# Patient Record
Sex: MALE | NOT HISPANIC OR LATINO | URBAN - METROPOLITAN AREA
[De-identification: names, ages, dates, MRNs, and addresses within clinical notes are randomized per-mention and may not be internally consistent; named-entity substitution may affect disease eponyms.]

---

## 2017-05-18 NOTE — PATIENT DISCUSSION
Cataract(s) are not yet visually significant to the patient. Recommend monitoring, and patient agrees with this plan.

## 2017-05-18 NOTE — PATIENT DISCUSSION
Despite some risk factors, the patient does not demonstrate definitive evidence of glaucoma at this time.

## 2017-05-18 NOTE — PATIENT DISCUSSION
Discussed condition and exacerbating conditions/situations (e.g., dry/arid environments, overhead fans, air conditioners, side effect of medications).

## 2019-07-22 NOTE — PATIENT DISCUSSION
Contact Lens trial with NORMA/STEVIE, RTC to assess adaption. Pt was previous CL wearer. I&R reviewed, solution dispensed.

## 2019-08-16 NOTE — PATIENT DISCUSSION
Cataract surgery has been performed in the first eye and activities of daily living are still impaired. The patient would like to proceed with cataract surgery in the second eye as scheduled. The patient elects CV , goal of -2.00.

## 2019-08-23 NOTE — PATIENT DISCUSSION
JOSE - positive, JOSE pattern - nucleolar; refer for Additional workup by Rheumatologist. Testing for Home O2    O2 Sats on RA at rest = 95%    O2 sats on RA with exercise  = 85%    O2 sats on 2L O2 with exercise = 94%

## 2019-10-24 NOTE — PROCEDURE NOTE: SURGICAL
Prior to commencing surgery, Dr. Cris Estrada confirmed patient identification, surgical procedure, site and side. Following topical proparacaine anesthesia, the patient was positioned at the YAG laser, a contact lens was coupled to the cornea of the right eye with methylcellulose and an axial posterior capsulotomy was performed without complication using 3.2 Mj x 26. Attention was turned to the left eye and a contact lens was coupled to the cornea of the left eye with methylcellulose and an axial posterior capsulotomy was performed without complication using 3.2 Mj x 25. Excess methylcellulose was washed from both eyes, one drop of Alphagan was instilled in each eye and the patient returned to the holding area having tolerated the procedure well and without complication. <br />[id number]<TG /><br />

## 2019-11-12 NOTE — PATIENT DISCUSSION
advised patient she may not be adapting to MonoVision , patient should trial a CL in OS to treat astig. , if patient still has glare rec CL trial for ELMER SAHA.

## 2019-11-12 NOTE — PATIENT DISCUSSION
w/ Epiphora , if patient continues to be bothered may recommend Conj Plasty . rec  to monitor at this time.

## 2019-11-13 NOTE — PATIENT DISCUSSION
CL trial with MV astigmatism correction dispensd, RTC later today and reassess. Continue trial order 2 other MV options and reassess prior to finalizing plan.

## 2019-12-04 NOTE — PATIENT DISCUSSION
advised patient she can wear glasses that can be made for driving in order to balance distance vision while driving,. discussed with patient that we are striving for the most independence from glasses , she may want glasses for some things.

## 2019-12-04 NOTE — PATIENT DISCUSSION
patient is more happy with the astigmatism correction in CL so  REC EPI-LASEK w/ MMC  goal -1.50 to -1.75.

## 2020-01-13 NOTE — PROCEDURE NOTE: CLINICAL
PROCEDURE NOTE: Probing of Lacrimal Canaliculi, With or Without Irrigation OS. Diagnosis: Epiphora Due to Insufficient Drainage. Risks, benefits and alternatives discussed. The patient desires to proceed with probe and irrigation of the involved puncta today and the puncta/lacrimal system was found to be patent. See chart plan notes for further discussion. Patient tolerated the procedure well and left in good condition. Bria Sultana

## 2020-01-13 NOTE — PATIENT DISCUSSION
Risks and benefits of probe and irrigation discussed.  Patient desires to proceed.  This procedure can be repeated  if patient finds helpful.  Puncta was found to be patent. Per history of dry eyes.  Continue PFAT's.    Follow up with Dr. Denzel Ortiz for his next recommendations, see #2 and #3 of plan notes per Dr. Denzel Ortiz.

## 2020-01-22 NOTE — PATIENT DISCUSSION
Patients dry eye has been worse since Epi-Lasek in 6 weeks ago. Advised that it will take up to 6 months from lasek for dry eye to return to baseline.

## 2020-01-22 NOTE — PATIENT DISCUSSION
Risks and benefits of probe and irrigation discussed.  Patient desires to proceed.  This procedure can be repeated  if patient finds helpful.  Puncta was found to be patent. Per history of dry eyes.  Continue PFAT's.    Follow up with Dr. Olivia Wilkes for his next recommendations, see #2 and #3 of plan notes per Dr. Olivia Wilkes.

## 2020-01-22 NOTE — PATIENT DISCUSSION
tearing  is bad OS and worse after epi lasek OS -likely secondary to worsened dry eye/reflex tearing.

## 2020-01-22 NOTE — PATIENT DISCUSSION
Recommend lid scrubs, Continue celluvisc 4x a day. Cequa BID OU and azithromycin monthly for 3 months.

## 2020-02-07 NOTE — PATIENT DISCUSSION
Discussed condition and exacerbating conditions/situations (e.g., dry/arid environments, overhead fans, air conditioners, side effect of medications). PNL/PL updated  Patient aware of results via [facility].     Viewed by Mary Garcia on 1/29/2020  6:44 PM   Written by Aj Pelaez MD on 1/29/2020  4:21 PM

## 2020-02-24 NOTE — PATIENT DISCUSSION
Risks and benefits of probe and irrigation discussed.  Patient desires to proceed.  This procedure can be repeated  if patient finds helpful.  Puncta was found to be patent. Per history of dry eyes.  Continue PFAT's.    Follow up with Dr. Amanda Cabrera for his next recommendations, see #2 and #3 of plan notes per Dr. Amanda Cabrera.

## 2020-02-24 NOTE — PATIENT DISCUSSION
Recommend lid scrubs, Continue celluvisc 4x a day. Cequa BID OU and azithromycin monthly for 3 months. Change to [place] due to cost of [place] L[place].

## 2020-04-09 NOTE — PATIENT DISCUSSION
Recommend lid scrubs, Continue celluvisc 3x a day. Systane gel OU qhs. Cequa BID OU. Change to [place] [facility] due to cost of Mertha Councilman.

## 2020-04-09 NOTE — PATIENT DISCUSSION
Risks and benefits of probe and irrigation discussed.  Patient desires to proceed.  This procedure can be repeated  if patient finds helpful.  Puncta was found to be patent. Per history of dry eyes.  Continue PFAT's.    Follow up with Dr. James Joel for his next recommendations, see #2 and #3 of plan notes per Dr. James Joel.

## 2020-04-09 NOTE — PATIENT DISCUSSION
Patients dry eye has been worse since Epi-Lasek in 9 months ago. Advised that it will take up to 6 months from lasek for dry eye to return to baseline.

## 2020-05-28 NOTE — PATIENT DISCUSSION
Risks and benefits of probe and irrigation discussed.  Patient desires to proceed.  This procedure can be repeated  if patient finds helpful.  Puncta was found to be patent. Per history of dry eyes.  Continue PFAT's.    Follow up with Dr. Steffen Ricci for his next recommendations, see #2 and #3 of plan notes per Dr. Steffen Ricci.

## 2020-08-10 NOTE — PATIENT DISCUSSION
Recommend lid scrubs, Continue celluvisc 3x a day. Systane gel OU qhs. Cequa BID OU. Change to [place] due to cost of [place].

## 2020-08-10 NOTE — PROCEDURE NOTE: CLINICAL
PROCEDURE NOTE: Punctal Plugs, Kane Weir ([id number], [id number]) #1 Left Lower Lid. Prior to treatment, the risks/benefits/alternatives were discussed. The patient wished to proceed with procedure. Temporary collagen plugs were inserted. Patient tolerated procedure well. There were no complications. Post procedure instructions given. Bria Sultana

## 2020-08-19 NOTE — PROCEDURE NOTE: CLINICAL
PROCEDURE NOTE: Punctal Plugs, Silicone Anesthesia: Topical. Prep: Antibiotic Drops q 5min x 3. Prior to treatment, the risks/benefits/alternatives were discussed. The patient wished to proceed with procedure. One drop of proparacaine was placed and a drop of lidocaine gel was placed over the puncta. Oasis 0.4 mm permanent silicone plugs were inserted in LLL eyelids. Patient tolerated procedure well. There were no complications. Post procedure instructions given. Capri Delgado

## 2020-08-19 NOTE — PATIENT DISCUSSION
Risks and benefits of probe and irrigation discussed.  Patient desires to proceed.  This procedure can be repeated  if patient finds helpful.  Puncta was found to be patent. Per history of dry eyes.  Continue PFAT's.    Follow up with Dr. Yeyo Garcia for his next recommendations, see #2 and #3 of plan notes per Dr. Yeyo Garcia.

## 2020-08-19 NOTE — PATIENT DISCUSSION
Recommend lid scrubs, Continue celluvisc 3x a day. Systane gel OU qhs. Cequa BID OU. Change to [place] due to cost of Geovanni Antoine.

## 2020-10-06 NOTE — PATIENT DISCUSSION
Risks and benefits of probe and irrigation discussed.  Patient desires to proceed.  This procedure can be repeated  if patient finds helpful.  Puncta was found to be patent. Per history of dry eyes.  Continue PFAT's.    Follow up with Dr. Bridgett Banuelos for his next recommendations, see #2 and #3 of plan notes per Dr. Bridgett Banuelos. Detail Level: Detailed Depth Of Biopsy: dermis Was A Bandage Applied: Yes Size Of Lesion In Cm: 0 Biopsy Type: H and E Biopsy Method: Personna blade Anesthesia Type: 1% lidocaine without epinephrine Anesthesia Volume In Cc (Will Not Render If 0): 0.6 Hemostasis: Drysol Wound Care: Vaseline Dressing: pressure dressing with telfa Destruction After The Procedure: No Type Of Destruction Used: Curettage Curettage Text: The wound bed was treated with curettage after the biopsy was performed. Cryotherapy Text: The wound bed was treated with cryotherapy after the biopsy was performed. Electrodesiccation Text: The wound bed was treated with electrodesiccation after the biopsy was performed. Electrodesiccation And Curettage Text: The wound bed was treated with electrodesiccation and curettage after the biopsy was performed. Silver Nitrate Text: The wound bed was treated with silver nitrate after the biopsy was performed. Lab: [id number] Lab Facility: [id number] Consent: Verbal consent was obtained and risks were reviewed including but not limited to scarring, infection, bleeding, scabbing, incomplete removal, nerve damage and allergy to anesthesia. Post-Care Instructions: I reviewed with the patient in detail post-care instructions. Patient is to keep the biopsy site dry overnight, and then apply vaseline twice daily until healed. Notification Instructions: Patient will be notified of biopsy results. However, patient instructed to call the office if not contacted within 2 weeks. Billing Type: Third-Party Bill Information: Selecting Yes will display possible errors in your note based on the variables you have selected. This validation is only offered as a suggestion for you. PLEASE NOTE THAT THE VALIDATION TEXT WILL BE REMOVED WHEN YOU FINALIZE YOUR NOTE. IF YOU WANT TO FAX A PRELIMINARY NOTE YOU WILL NEED TO TOGGLE THIS TO 'NO' IF YOU DO NOT WANT IT IN YOUR FAXED NOTE. Lab: [id number] Lab Facility: [id number] Billing Type: Third-Party Bill

## 2020-10-06 NOTE — PATIENT DISCUSSION
Recommend lid scrubs, Continue celluvisc 3x a day. Systane gel OU qhs. Cequa BID OU. Change to [place] due to cost of Yanira Party.

## 2020-12-07 NOTE — PATIENT DISCUSSION
Risks and benefits of probe and irrigation discussed.  Patient desires to proceed.  This procedure can be repeated  if patient finds helpful.  Puncta was found to be patent. Per history of dry eyes.  Continue PFAT's.    Follow up with Dr. Odalys Navarrete for his next recommendations, see #2 and #3 of plan notes per Dr. Odalys Navarrete.

## 2020-12-14 ENCOUNTER — IMPORTED ENCOUNTER (OUTPATIENT)
Dept: URBAN - METROPOLITAN AREA CLINIC 1 | Facility: CLINIC | Age: 40
End: 2020-12-14

## 2020-12-14 PROBLEM — H04.123: Noted: 2020-12-14

## 2020-12-14 PROBLEM — H16.143: Noted: 2020-12-14

## 2020-12-14 PROBLEM — H18.413: Noted: 2020-12-14

## 2020-12-14 PROCEDURE — 92004 COMPRE OPH EXAM NEW PT 1/>: CPT

## 2020-12-14 PROCEDURE — 92015 DETERMINE REFRACTIVE STATE: CPT

## 2020-12-14 NOTE — PATIENT DISCUSSION
1.  Arcus OU - Observe. Reassurance given. 2.  LATESHA w/ PEK OU - Recommend ATs BID OU routinely (2x sample given). MRX for glasses given. Return for an appointment in 2 years 27 with Dr. Aliza Silva.

## 2021-01-06 NOTE — PROCEDURE NOTE: CLINICAL
PROCEDURE NOTE: Punctal Plugs, Silicone #1 Left Lower Lid. Prior to treatment, the risks/benefits/alternatives were discussed. The patient wished to proceed with procedure. Eaglevision 0.4 mm permanent silicone plugs were inserted in RLL eyelids. Patient tolerated procedure well. There were no complications. Post procedure instructions given. Rossi Francisco

## 2021-01-06 NOTE — PATIENT DISCUSSION
Recommend lid scrubs, Continue celluvisc 3x a day. Systane gel OU qhs. Cequa BID OU. Change to [place] due to cost of Worthy Sprout.

## 2021-01-06 NOTE — PATIENT DISCUSSION
Risks and benefits of probe and irrigation discussed.  Patient desires to proceed.  This procedure can be repeated  if patient finds helpful.  Puncta was found to be patent. Per history of dry eyes.  Continue PFAT's.    Follow up with Dr. Suyapa Ly for his next recommendations, see #2 and #3 of plan notes per Dr. Suyapa Ly.

## 2021-02-16 NOTE — PROCEDURE NOTE: CLINICAL
PROCEDURE NOTE: Punctal Plugs, Silicone #1 Left Lower Lid. Anesthesia: Topical. Prior to treatment, the risks/benefits/alternatives were discussed. The patient wished to proceed with procedure. Permanent silicone plugs were inserted. Size/location of plugs inserted: LLL0.7mm. Patient tolerated procedure well. There were no complications. Post procedure instructions given. Ruslan Nur

## 2021-02-16 NOTE — PATIENT DISCUSSION
Pt. was recommended to use Abraham-128 or Systane nighttime ointment in OS. Pt. was also given Ilevro sample in office to use qday in OS.

## 2021-04-14 NOTE — PATIENT DISCUSSION
Pt unhappy with the chronic DANO symptoms post surgery. Nothing seems to help. Requesting appointment with Dr Ashwin Coronel for a solution.

## 2021-04-14 NOTE — PATIENT DISCUSSION
Risks and benefits of probe and irrigation discussed.  Patient desires to proceed.  This procedure can be repeated  if patient finds helpful.  Puncta was found to be patent. Per history of dry eyes.  Continue PFAT's.    Follow up with Dr. Augusto Valles for his next recommendations, see #2 and #3 of plan notes per Dr. Augusto Valles.

## 2021-05-07 NOTE — PATIENT DISCUSSION
5. 6.2021 Discussed spasm, discussed fatigue and stress, try to avoid these.  Discussed if doesn't relieve there are injection treatments.

## 2021-05-07 NOTE — PATIENT DISCUSSION
Risks and benefits of probe and irrigation discussed.  Patient desires to proceed.  This procedure can be repeated  if patient finds helpful.  Puncta was found to be patent. Per history of dry eyes.  Continue PFAT's.    Follow up with Dr. Camilla Ross for his next recommendations, see #2 and #3 of plan notes per Dr. Camilla Ross.

## 2021-05-07 NOTE — PATIENT DISCUSSION
Pt unhappy with the chronic DANO symptoms post surgery. Nothing seems to help. Requesting appointment with Dr Lei Hill for a solution.

## 2021-05-28 NOTE — PATIENT DISCUSSION
Risks and benefits of probe and irrigation discussed.  Patient desires to proceed.  This procedure can be repeated  if patient finds helpful.  Puncta was found to be patent. Per history of dry eyes.  Continue PFAT's.    Follow up with Dr. Pineda Castañeda for his next recommendations, see #2 and #3 of plan notes per Dr. Pineda Castañeda.

## 2021-05-28 NOTE — PATIENT DISCUSSION
Contact Lens trial with NORMA/SETVIE, RTC to assess adaption. Pt was previous CL wearer. I&R reviewed, solution dispensed.

## 2021-05-28 NOTE — PATIENT DISCUSSION
5/28/1 JOD: Educated patient that glare and blurry vision is common with monovision in near vision eye. Discussed with patient that a pair of glasses may help patient with glare. Patient seems to be intolerant of monovision. Reocmmend CTL trial to see if patient prefers DVA OU (for reading, patient would take out left contact lens or would use readers over the contact lenses). If patient wishes for permanent solution and like CTL trial could consider epiLASEK OS.  Other factors other than refractive error contributing to glare  at distance:  mild dry eye, RK incisions.  If patient wants epiLASEK, she would be a candidate and can refer back to [facility].

## 2021-06-10 NOTE — PATIENT DISCUSSION
Risks and benefits of probe and irrigation discussed.  Patient desires to proceed.  This procedure can be repeated  if patient finds helpful.  Puncta was found to be patent. Per history of dry eyes.  Continue PFAT's.    Follow up with Dr. Jose Vides for his next recommendations, see #2 and #3 of plan notes per Dr. Jose Vides.

## 2021-06-17 NOTE — PATIENT DISCUSSION
5. 6.2021 Discussed spasm, discussed fatigue and stress, try to avoid these.  Discussed if doesn't relieve there are injection treatments.
5. 6.2021 Try steroid if helps may consider Yin or Og Fuentes.
5. 7.2021 art. tear missy q hs 1/4. to 1/2 ribbon lower [place] ou.
5/28/1 JOD: Educated patient that glare and blurry vision is common with monovision in near vision eye. Discussed with patient that a pair of glasses may help patient with glare. Patient seems to be intolerant of monovision. Reocmmend CTL trial to see if patient prefers DVA OU (for reading, patient would take out left contact lens or would use readers over the contact lenses). If patient wishes for permanent solution and like CTL trial could consider epiLASEK OS.  Other factors other than refractive error contributing to glare  at distance:  mild dry eye, RK incisions.  If patient wants epiLASEK, she would be a candidate and can refer back to [facility].
5/28/21 JOD: Monitor with OD.
5/28/21 JOD: Recommend warm compresses, lid scrubs and Oasis tears plus 2-4x daily.
Contact Lens trial with NORMA/STEVIE, RTC to assess adaption. Pt was previous CL wearer. I&R reviewed, solution dispensed.
Despite some risk factors, the patient does not demonstrate definitive evidence of glaucoma at this time.
Discussed need for further evaluation with VF and HRT/OCT.
Diurnal variations will continue to occur which will cause Glare.
Good postoperative appearance.
Good postoperative appearance. Follow.
[facility].
Letitia hanley, alternative discussed. Scripts given.
Order Fundus Image.
Patient understands condition, prognosis and need for follow up care.
Post op instructions reviewed with patient including the use of drops.
Pt successfully adapted to the monovision and desires to proceed with the surgery.
REC EPI-LASEK w/ MMC  goal -1.50 to -1.75.
Results of CL trial. Goal -1.00-1.25 *30.
Risks and benefits of probe and irrigation discussed.  Patient desires to proceed.  This procedure can be repeated  if patient finds helpful.  Puncta was found to be patent. Per history of dry eyes.  Continue PFAT's.    Follow up with Dr. Janet Steward for his next recommendations, see #2 and #3 of plan notes per Dr. Janet Steward.
See dry eye.
Stable.
Well healed. Follow.
advised patient she can wear glasses that can be made for driving in order to balance distance vision while driving,. discussed with patient that we are striving for the most independence from glasses , she may want glasses for some things.
axis is @ 70 degrees.
ed with RK can take 3-4 months for BCVA to be achieved.
patient is more happy with the astigmatism correction in CL so  REC EPI-LASEK w/ MMC  goal -1.50 to -1.75.
PA Alex

## 2021-10-07 NOTE — PATIENT DISCUSSION
Risks and benefits of probe and irrigation discussed.  Patient desires to proceed.  This procedure can be repeated  if patient finds helpful.  Puncta was found to be patent. Per history of dry eyes.  Continue PFAT's.    Follow up with Dr. Daria Vernon for his next recommendations, see #2 and #3 of plan notes per Dr. Daria Vernon.

## 2022-01-13 NOTE — PATIENT DISCUSSION
Recommend lid scrubs, Continue celluvisc 3x a day. Systane gel OU qhs. Cequa BID OU. Change to [place] due to cost of Franky Sailors. Yes

## 2022-02-18 NOTE — PATIENT DISCUSSION
10/7/21: Systane gel OU qhs.
5. 6.2021 Discussed spasm, discussed fatigue and stress, try to avoid these.  Discussed if doesn't relieve there are injection treatments.
5. 6.2021 Try steroid if helps may consider Yin or Ericka Colemano.
5. 7.2021 art. tear missy q hs 1/4. to 1/2 ribbon lower [place] ou.
5/28/1 JOD: Educated patient that glare and blurry vision is common with monovision in near vision eye. Discussed with patient that a pair of glasses may help patient with glare. Patient seems to be intolerant of monovision. Reocmmend CTL trial to see if patient prefers DVA OU (for reading, patient would take out left contact lens or would use readers over the contact lenses). If patient wishes for permanent solution and like CTL trial could consider epiLASEK OS.  Other factors other than refractive error contributing to glare  at distance:  mild dry eye, RK incisions.  If patient wants epiLASEK, she would be a candidate and can refer back to [facility].
5/28/21 JOD: Monitor with OD.
5/28/21 JOD: Recommend warm compresses, lid scrubs and Oasis tears plus 2-4x daily.
Contact Lens trial with NORMA/STEVIE, RTC to assess adaption. Pt was previous CL wearer. I&R reviewed, solution dispensed.
Despite some risk factors, the patient does not demonstrate definitive evidence of glaucoma at this time.
Discussed importance of compliance with ocular medications and follow up exams to prevent loss of vision.
Discussed need for further evaluation with VF and HRT/OCT.
Diurnal variations will continue to occur which will cause Glare.
Good postoperative appearance.
Good postoperative appearance. Follow.
[facility].
Letitia hanley, alternative discussed. Scripts given.
Order Fundus Image.
Patient understands condition, prognosis and need for follow up care.
Post op instructions reviewed with patient including the use of drops.
Pt successfully adapted to the monovision and desires to proceed with the surgery.
REC EPI-LASEK w/ MMC  goal -1.50 to -1.75.
Results of CL trial. Goal -1.00-1.25 *30.
Risks and benefits of probe and irrigation discussed.  Patient desires to proceed.  This procedure can be repeated  if patient finds helpful.  Puncta was found to be patent. Per history of dry eyes.  Continue PFAT's.    Follow up with Dr. Yayo Banuelos for his next recommendations, see #2 and #3 of plan notes per Dr. Yayo Banuelos.
See dry eye.
Stable.
VF 24-2. OD, baseline full, OS borderline superior arcuate changes.
Well healed. Follow.
advised patient she can wear glasses that can be made for driving in order to balance distance vision while driving,. discussed with patient that we are striving for the most independence from glasses , she may want glasses for some things.
axis is @ 70 degrees.
ed with RK can take 3-4 months for BCVA to be achieved.
patient is more happy with the astigmatism correction in CL so  REC EPI-LASEK w/ MMC  goal -1.50 to -1.75.
4

## 2022-03-15 NOTE — PATIENT DISCUSSION
Risks and benefits of probe and irrigation discussed.  Patient desires to proceed.  This procedure can be repeated  if patient finds helpful.  Puncta was found to be patent. Per history of dry eyes.  Continue PFAT's.    Follow up with Dr. Dani Blair for his next recommendations, see #2 and #3 of plan notes per Dr. Dani Blair.

## 2022-04-02 ASSESSMENT — TONOMETRY
OD_IOP_MMHG: 17
OS_IOP_MMHG: 17

## 2022-04-02 ASSESSMENT — VISUAL ACUITY
OS_SC: J1
OS_CC: 20/20
OD_SC: J1
OD_CC: 20/20

## 2022-10-06 NOTE — PATIENT DISCUSSION
Risks and benefits of probe and irrigation discussed.  Patient desires to proceed.  This procedure can be repeated  if patient finds helpful.  Puncta was found to be patent. Per history of dry eyes.  Continue PFAT's.    Follow up with Dr. Layo Booth for his next recommendations, see #2 and #3 of plan notes per Dr. Layo Booth.

## 2022-12-19 NOTE — PATIENT DISCUSSION
axis is @ 70 degrees. Cephalexin Pregnancy And Lactation Text: This medication is Pregnancy Category B and considered safe during pregnancy.  It is also excreted in breast milk but can be used safely for shorter doses.

## 2023-01-06 NOTE — PATIENT DISCUSSION
Risks and benefits of probe and irrigation discussed.  Patient desires to proceed.  This procedure can be repeated  if patient finds helpful.  Puncta was found to be patent. Per history of dry eyes.  Continue PFAT's.    Follow up with Dr. Miky Bullock for his next recommendations, see #2 and #3 of plan notes per Dr. Miky Bullock.

## 2024-12-03 NOTE — PATIENT DISCUSSION
BMI 97 percentile for age, emphasized need to improve his lifestyle with more fruit and vegetable intake, drinking primarily water, continue avoiding fast food intake, restrict junk foods, and need for regular physical activity.  Check related screening labs.   [facility].

## 2024-12-14 NOTE — PATIENT DISCUSSION
Isotretinoin Counseling: Patient should get monthly blood tests, not donate blood, not drive at night if vision affected, not share medication, and not undergo elective surgery for 6 months after tx completed. Side effects reviewed, pt to contact office should one occur. REC EPI-LASEK w/ [facility]  goal -1.50 to -1.75. Azelaic Acid Counseling: Patient counseled that medicine may cause skin irritation and to avoid applying near the eyes.  In the event of skin irritation, the patient was advised to reduce the amount of the drug applied or use it less frequently.   The patient verbalized understanding of the proper use and possible adverse effects of azelaic acid.  All of the patient's questions and concerns were addressed. Birth Control Pills Pregnancy And Lactation Text: This medication should be avoided if pregnant and for the first 30 days post-partum. Sarecycline Pregnancy And Lactation Text: This medication is Pregnancy Category D and not consider safe during pregnancy. It is also excreted in breast milk. Tazorac Pregnancy And Lactation Text: This medication is not safe during pregnancy. It is unknown if this medication is excreted in breast milk. Benzoyl Peroxide Counseling: Patient counseled that medicine may cause skin irritation and bleach clothing.  In the event of skin irritation, the patient was advised to reduce the amount of the drug applied or use it less frequently.   The patient verbalized understanding of the proper use and possible adverse effects of benzoyl peroxide.  All of the patient's questions and concerns were addressed. Spironolactone Pregnancy And Lactation Text: This medication can cause feminization of the male fetus and should be avoided during pregnancy. The active metabolite is also found in breast milk. High Dose Vitamin A Counseling: Side effects reviewed, pt to contact office should one occur. Topical Clindamycin Pregnancy And Lactation Text: This medication is Pregnancy Category B and is considered safe during pregnancy. It is unknown if it is excreted in breast milk. Azithromycin Counseling:  I discussed with the patient the risks of azithromycin including but not limited to GI upset, allergic reaction, drug rash, diarrhea, and yeast infections. Dapsone Pregnancy And Lactation Text: This medication is Pregnancy Category C and is not considered safe during pregnancy or breast feeding. Azithromycin Pregnancy And Lactation Text: This medication is considered safe during pregnancy and is also secreted in breast milk. Doxycycline Counseling:  Patient counseled regarding possible photosensitivity and increased risk for sunburn.  Patient instructed to avoid sunlight, if possible.  When exposed to sunlight, patients should wear protective clothing, sunglasses, and sunscreen.  The patient was instructed to call the office immediately if the following severe adverse effects occur:  hearing changes, easy bruising/bleeding, severe headache, or vision changes.  The patient verbalized understanding of the proper use and possible adverse effects of doxycycline.  All of the patient's questions and concerns were addressed. High Dose Vitamin A Pregnancy And Lactation Text: High dose vitamin A therapy is contraindicated during pregnancy and breast feeding. Detail Level: Detailed Patient Specific Counseling (Will Not Stick From Patient To Patient): -discussed with patient how to use Tretinoin topical at night. Aklief counseling:  Patient advised to apply a pea-sized amount only at bedtime and wait 30 minutes after washing their face before applying.  If too drying, patient may add a non-comedogenic moisturizer.  The most commonly reported side effects including irritation, redness, scaling, dryness, stinging, burning, itching, and increased risk of sunburn.  The patient verbalized understanding of the proper use and possible adverse effects of retinoids.  All of the patient's questions and concerns were addressed. Topical Retinoid Pregnancy And Lactation Text: This medication is Pregnancy Category C. It is unknown if this medication is excreted in breast milk. Include Pregnancy/Lactation Warning?: No Erythromycin Counseling:  I discussed with the patient the risks of erythromycin including but not limited to GI upset, allergic reaction, drug rash, diarrhea, increase in liver enzymes, and yeast infections. Winlevi Counseling:  I discussed with the patient the risks of topical clascoterone including but not limited to erythema, scaling, itching, and stinging. Patient voiced their understanding. Bactrim Pregnancy And Lactation Text: This medication is Pregnancy Category D and is known to cause fetal risk.  It is also excreted in breast milk. Topical Clindamycin Counseling: Patient counseled that this medication may cause skin irritation or allergic reactions.  In the event of skin irritation, the patient was advised to reduce the amount of the drug applied or use it less frequently.   The patient verbalized understanding of the proper use and possible adverse effects of clindamycin.  All of the patient's questions and concerns were addressed. Spironolactone Counseling: Patient advised regarding risks of diarrhea, abdominal pain, hyperkalemia, birth defects (for female patients), liver toxicity and renal toxicity. The patient may need blood work to monitor liver and kidney function and potassium levels while on therapy. The patient verbalized understanding of the proper use and possible adverse effects of spironolactone.  All of the patient's questions and concerns were addressed. Dapsone Counseling: I discussed with the patient the risks of dapsone including but not limited to hemolytic anemia, agranulocytosis, rashes, methemoglobinemia, kidney failure, peripheral neuropathy, headaches, GI upset, and liver toxicity.  Patients who start dapsone require monitoring including baseline LFTs and weekly CBCs for the first month, then every month thereafter.  The patient verbalized understanding of the proper use and possible adverse effects of dapsone.  All of the patient's questions and concerns were addressed. Isotretinoin Pregnancy And Lactation Text: This medication is Pregnancy Category X and is considered extremely dangerous during pregnancy. It is unknown if it is excreted in breast milk. Azelaic Acid Pregnancy And Lactation Text: This medication is considered safe during pregnancy and breast feeding. Topical Sulfur Applications Counseling: Topical Sulfur Counseling: Patient counseled that this medication may cause skin irritation or allergic reactions.  In the event of skin irritation, the patient was advised to reduce the amount of the drug applied or use it less frequently.   The patient verbalized understanding of the proper use and possible adverse effects of topical sulfur application.  All of the patient's questions and concerns were addressed. Tetracycline Counseling: Patient counseled regarding possible photosensitivity and increased risk for sunburn.  Patient instructed to avoid sunlight, if possible.  When exposed to sunlight, patients should wear protective clothing, sunglasses, and sunscreen.  The patient was instructed to call the office immediately if the following severe adverse effects occur:  hearing changes, easy bruising/bleeding, severe headache, or vision changes.  The patient verbalized understanding of the proper use and possible adverse effects of tetracycline.  All of the patient's questions and concerns were addressed. Patient understands to avoid pregnancy while on therapy due to potential birth defects. Benzoyl Peroxide Pregnancy And Lactation Text: This medication is Pregnancy Category C. It is unknown if benzoyl peroxide is excreted in breast milk. Minocycline Counseling: Patient advised regarding possible photosensitivity and discoloration of the teeth, skin, lips, tongue and gums.  Patient instructed to avoid sunlight, if possible.  When exposed to sunlight, patients should wear protective clothing, sunglasses, and sunscreen.  The patient was instructed to call the office immediately if the following severe adverse effects occur:  hearing changes, easy bruising/bleeding, severe headache, or vision changes.  The patient verbalized understanding of the proper use and possible adverse effects of minocycline.  All of the patient's questions and concerns were addressed. Topical Retinoid counseling:  Patient advised to apply a pea-sized amount only at bedtime and wait 30 minutes after washing their face before applying.  If too drying, patient may add a non-comedogenic moisturizer. The patient verbalized understanding of the proper use and possible adverse effects of retinoids.  All of the patient's questions and concerns were addressed. Topical Sulfur Applications Pregnancy And Lactation Text: This medication is Pregnancy Category C and has an unknown safety profile during pregnancy. It is unknown if this topical medication is excreted in breast milk. Bactrim Counseling:  I discussed with the patient the risks of sulfa antibiotics including but not limited to GI upset, allergic reaction, drug rash, diarrhea, dizziness, photosensitivity, and yeast infections.  Rarely, more serious reactions can occur including but not limited to aplastic anemia, agranulocytosis, methemoglobinemia, blood dyscrasias, liver or kidney failure, lung infiltrates or desquamative/blistering drug rashes. Doxycycline Pregnancy And Lactation Text: This medication is Pregnancy Category D and not consider safe during pregnancy. It is also excreted in breast milk but is considered safe for shorter treatment courses. Winlevi Pregnancy And Lactation Text: This medication is considered safe during pregnancy and breastfeeding. Aklief Pregnancy And Lactation Text: It is unknown if this medication is safe to use during pregnancy.  It is unknown if this medication is excreted in breast milk.  Breastfeeding women should use the topical cream on the smallest area of the skin for the shortest time needed while breastfeeding.  Do not apply to nipple and areola. Tazorac Counseling:  Patient advised that medication is irritating and drying.  Patient may need to apply sparingly and wash off after an hour before eventually leaving it on overnight.  The patient verbalized understanding of the proper use and possible adverse effects of tazorac.  All of the patient's questions and concerns were addressed. Birth Control Pills Counseling: Birth Control Pill Counseling: I discussed with the patient the potential side effects of OCPs including but not limited to increased risk of stroke, heart attack, thrombophlebitis, deep venous thrombosis, hepatic adenomas, breast changes, GI upset, headaches, and depression.  The patient verbalized understanding of the proper use and possible adverse effects of OCPs. All of the patient's questions and concerns were addressed. Erythromycin Pregnancy And Lactation Text: This medication is Pregnancy Category B and is considered safe during pregnancy. It is also excreted in breast milk. Sarecycline Counseling: Patient advised regarding possible photosensitivity and discoloration of the teeth, skin, lips, tongue and gums.  Patient instructed to avoid sunlight, if possible.  When exposed to sunlight, patients should wear protective clothing, sunglasses, and sunscreen.  The patient was instructed to call the office immediately if the following severe adverse effects occur:  hearing changes, easy bruising/bleeding, severe headache, or vision changes.  The patient verbalized understanding of the proper use and possible adverse effects of sarecycline.  All of the patient's questions and concerns were addressed.